# Patient Record
Sex: FEMALE | Race: WHITE | NOT HISPANIC OR LATINO
[De-identification: names, ages, dates, MRNs, and addresses within clinical notes are randomized per-mention and may not be internally consistent; named-entity substitution may affect disease eponyms.]

---

## 2018-05-23 ENCOUNTER — RESULT REVIEW (OUTPATIENT)
Age: 68
End: 2018-05-23

## 2021-08-29 ENCOUNTER — INPATIENT (INPATIENT)
Facility: HOSPITAL | Age: 71
LOS: 2 days | Discharge: HOME | DRG: 287 | End: 2021-09-01
Attending: INTERNAL MEDICINE | Admitting: INTERNAL MEDICINE
Payer: MEDICARE

## 2021-08-29 VITALS
TEMPERATURE: 98 F | HEIGHT: 65 IN | RESPIRATION RATE: 18 BRPM | DIASTOLIC BLOOD PRESSURE: 78 MMHG | OXYGEN SATURATION: 98 % | HEART RATE: 78 BPM | WEIGHT: 169.98 LBS | SYSTOLIC BLOOD PRESSURE: 144 MMHG

## 2021-08-29 LAB
ALBUMIN SERPL ELPH-MCNC: 3.9 G/DL — SIGNIFICANT CHANGE UP (ref 3.3–5)
ALP SERPL-CCNC: 102 U/L — SIGNIFICANT CHANGE UP (ref 40–120)
ALT FLD-CCNC: 10 U/L — SIGNIFICANT CHANGE UP (ref 10–45)
ANION GAP SERPL CALC-SCNC: 13 MMOL/L — SIGNIFICANT CHANGE UP (ref 5–17)
AST SERPL-CCNC: 18 U/L — SIGNIFICANT CHANGE UP (ref 10–40)
BASOPHILS # BLD AUTO: 0.06 K/UL — SIGNIFICANT CHANGE UP (ref 0–0.2)
BASOPHILS NFR BLD AUTO: 0.7 % — SIGNIFICANT CHANGE UP (ref 0–2)
BILIRUB SERPL-MCNC: 0.4 MG/DL — SIGNIFICANT CHANGE UP (ref 0.2–1.2)
BUN SERPL-MCNC: 9 MG/DL — SIGNIFICANT CHANGE UP (ref 7–23)
CALCIUM SERPL-MCNC: 9.5 MG/DL — SIGNIFICANT CHANGE UP (ref 8.4–10.5)
CHLORIDE SERPL-SCNC: 101 MMOL/L — SIGNIFICANT CHANGE UP (ref 96–108)
CO2 SERPL-SCNC: 23 MMOL/L — SIGNIFICANT CHANGE UP (ref 22–31)
CREAT SERPL-MCNC: 0.68 MG/DL — SIGNIFICANT CHANGE UP (ref 0.5–1.3)
EOSINOPHIL # BLD AUTO: 0.29 K/UL — SIGNIFICANT CHANGE UP (ref 0–0.5)
EOSINOPHIL NFR BLD AUTO: 3.3 % — SIGNIFICANT CHANGE UP (ref 0–6)
GLUCOSE SERPL-MCNC: 90 MG/DL — SIGNIFICANT CHANGE UP (ref 70–99)
HCT VFR BLD CALC: 41 % — SIGNIFICANT CHANGE UP (ref 34.5–45)
HGB BLD-MCNC: 13.3 G/DL — SIGNIFICANT CHANGE UP (ref 11.5–15.5)
IMM GRANULOCYTES NFR BLD AUTO: 0.5 % — SIGNIFICANT CHANGE UP (ref 0–1.5)
LIDOCAIN IGE QN: 15 U/L — SIGNIFICANT CHANGE UP (ref 7–60)
LYMPHOCYTES # BLD AUTO: 1.28 K/UL — SIGNIFICANT CHANGE UP (ref 1–3.3)
LYMPHOCYTES # BLD AUTO: 14.7 % — SIGNIFICANT CHANGE UP (ref 13–44)
MCHC RBC-ENTMCNC: 30.8 PG — SIGNIFICANT CHANGE UP (ref 27–34)
MCHC RBC-ENTMCNC: 32.4 GM/DL — SIGNIFICANT CHANGE UP (ref 32–36)
MCV RBC AUTO: 94.9 FL — SIGNIFICANT CHANGE UP (ref 80–100)
MONOCYTES # BLD AUTO: 0.68 K/UL — SIGNIFICANT CHANGE UP (ref 0–0.9)
MONOCYTES NFR BLD AUTO: 7.8 % — SIGNIFICANT CHANGE UP (ref 2–14)
NEUTROPHILS # BLD AUTO: 6.37 K/UL — SIGNIFICANT CHANGE UP (ref 1.8–7.4)
NEUTROPHILS NFR BLD AUTO: 73 % — SIGNIFICANT CHANGE UP (ref 43–77)
NRBC # BLD: 0 /100 WBCS — SIGNIFICANT CHANGE UP (ref 0–0)
PLATELET # BLD AUTO: 275 K/UL — SIGNIFICANT CHANGE UP (ref 150–400)
POTASSIUM SERPL-MCNC: 4.2 MMOL/L — SIGNIFICANT CHANGE UP (ref 3.5–5.3)
POTASSIUM SERPL-SCNC: 4.2 MMOL/L — SIGNIFICANT CHANGE UP (ref 3.5–5.3)
PROT SERPL-MCNC: 7.3 G/DL — SIGNIFICANT CHANGE UP (ref 6–8.3)
RBC # BLD: 4.32 M/UL — SIGNIFICANT CHANGE UP (ref 3.8–5.2)
RBC # FLD: 13.2 % — SIGNIFICANT CHANGE UP (ref 10.3–14.5)
SARS-COV-2 RNA SPEC QL NAA+PROBE: SIGNIFICANT CHANGE UP
SODIUM SERPL-SCNC: 137 MMOL/L — SIGNIFICANT CHANGE UP (ref 135–145)
TROPONIN T, HIGH SENSITIVITY RESULT: <6 NG/L — SIGNIFICANT CHANGE UP (ref 0–51)
TROPONIN T, HIGH SENSITIVITY RESULT: <6 NG/L — SIGNIFICANT CHANGE UP (ref 0–51)
WBC # BLD: 8.72 K/UL — SIGNIFICANT CHANGE UP (ref 3.8–10.5)
WBC # FLD AUTO: 8.72 K/UL — SIGNIFICANT CHANGE UP (ref 3.8–10.5)

## 2021-08-29 PROCEDURE — 71046 X-RAY EXAM CHEST 2 VIEWS: CPT | Mod: 26

## 2021-08-29 RX ORDER — CITALOPRAM 10 MG/1
5 TABLET, FILM COATED ORAL ONCE
Refills: 0 | Status: COMPLETED | OUTPATIENT
Start: 2021-08-29 | End: 2021-08-29

## 2021-08-29 RX ORDER — ACETAMINOPHEN 500 MG
975 TABLET ORAL ONCE
Refills: 0 | Status: COMPLETED | OUTPATIENT
Start: 2021-08-29 | End: 2021-08-29

## 2021-08-29 RX ORDER — SIMVASTATIN 20 MG/1
10 TABLET, FILM COATED ORAL AT BEDTIME
Refills: 0 | Status: DISCONTINUED | OUTPATIENT
Start: 2021-08-29 | End: 2021-08-30

## 2021-08-29 RX ORDER — ASPIRIN/CALCIUM CARB/MAGNESIUM 324 MG
324 TABLET ORAL ONCE
Refills: 0 | Status: COMPLETED | OUTPATIENT
Start: 2021-08-29 | End: 2021-08-29

## 2021-08-29 RX ORDER — ATORVASTATIN CALCIUM 80 MG/1
40 TABLET, FILM COATED ORAL AT BEDTIME
Refills: 0 | Status: DISCONTINUED | OUTPATIENT
Start: 2021-08-29 | End: 2021-08-29

## 2021-08-29 RX ORDER — FAMOTIDINE 10 MG/ML
20 INJECTION INTRAVENOUS ONCE
Refills: 0 | Status: COMPLETED | OUTPATIENT
Start: 2021-08-29 | End: 2021-08-29

## 2021-08-29 RX ORDER — GABAPENTIN 400 MG/1
400 CAPSULE ORAL ONCE
Refills: 0 | Status: COMPLETED | OUTPATIENT
Start: 2021-08-29 | End: 2021-08-29

## 2021-08-29 RX ORDER — ASPIRIN/CALCIUM CARB/MAGNESIUM 324 MG
325 TABLET ORAL ONCE
Refills: 0 | Status: DISCONTINUED | OUTPATIENT
Start: 2021-08-29 | End: 2021-08-29

## 2021-08-29 RX ADMIN — Medication 30 MILLILITER(S): at 20:04

## 2021-08-29 RX ADMIN — FAMOTIDINE 20 MILLIGRAM(S): 10 INJECTION INTRAVENOUS at 20:04

## 2021-08-29 RX ADMIN — SIMVASTATIN 10 MILLIGRAM(S): 20 TABLET, FILM COATED ORAL at 23:15

## 2021-08-29 RX ADMIN — GABAPENTIN 400 MILLIGRAM(S): 400 CAPSULE ORAL at 23:15

## 2021-08-29 RX ADMIN — Medication 324 MILLIGRAM(S): at 20:04

## 2021-08-29 RX ADMIN — Medication 975 MILLIGRAM(S): at 23:33

## 2021-08-29 RX ADMIN — CITALOPRAM 5 MILLIGRAM(S): 10 TABLET, FILM COATED ORAL at 23:22

## 2021-08-29 NOTE — ED ADULT NURSE NOTE - CHPI ED NUR SYMPTOMS NEG
no chills/no congestion/no diaphoresis/no dizziness/no fever/no nausea/no shortness of breath/no syncope/no vomiting

## 2021-08-29 NOTE — ED CDU PROVIDER INITIAL DAY NOTE - DETAILS
71 y F, hx of HLD, RHD (during childhood) c/o chest pain/epigastric pain  Plan: frequent reeval, vitals q 4hrs, tele, nuclear stress

## 2021-08-29 NOTE — ED PROVIDER NOTE - CLINICAL SUMMARY MEDICAL DECISION MAKING FREE TEXT BOX
71 y F, hx of HLD, presenting with epigastric/chest pain. Will do cardiac workup and admit to CDU for stress test. 71 y F, hx of HLD, presenting with epigastric/chest pain. ecg is normal  concern for unstable angina  vs GERD Will do cardiac workup and admit to CDU for stress test.tomorrow ZR

## 2021-08-29 NOTE — ED CDU PROVIDER DISPOSITION NOTE - NSFOLLOWUPINSTRUCTIONS_ED_ALL_ED_FT
1. Follow up with your PMD within 48-72 hours.   You may schedule appointment with Cardiology clinic this week by calling [phone]  2. Show copies of your reports given to you. Recommend Aspirin 81mg over the counter daily until further evaluation.  Take all of your other medications as previously prescribed.   3. Worsening or continued chest pain, shortness of breath, weakness, return to ED.

## 2021-08-29 NOTE — ED ADULT TRIAGE NOTE - CHIEF COMPLAINT QUOTE
PT presents to ED with epigastric pain described stabbing/pressure radiating through to her back. Pt takes a statin

## 2021-08-29 NOTE — ED CDU PROVIDER DISPOSITION NOTE - ATTENDING CONTRIBUTION TO CARE
Attending note (Javad): 70 y/o F with h/o HLD, rheumatic heart disease as child, c/o chest pain yesterday while cooking.  Resolved after ~1 hour; no fever, no cough/congestion, no SOB. In ED, patient evaluated, was stable, ECG showed no signs of acute ischemia/infarct. Initial labs notable for troponin < 6.In CDU, no events on tele monitoring, remained stable.  Further evaluation noted significant abnormalities on cardiac stress testing, and will plan for admission to hospital for further evaluation/management.

## 2021-08-29 NOTE — ED PROVIDER NOTE - NS ED ROS FT
Constitutional:  (-) fever, (-) chills, (-) lethargy  Eyes:  (-) eye pain (-) visual changes  ENMT: (-) nasal discharge, (-) sore throat. (-) neck pain or stiffness  Cardiac: (+) chest pain (-) palpitations  Respiratory:  (-) cough (-) respiratory distress.   GI:  (-) nausea (-) vomiting (-) diarrhea (-) abdominal pain (+) epigastric pain   :  (-) dysuria (-) frequency (-) burning.  MS:  (-) back pain (-) joint pain.  Neuro:  (-) headache (-) numbness (-) tingling (-) focal weakness  Skin:  (-) rash  Except as documented in the HPI,  all other systems are negative

## 2021-08-29 NOTE — ED CDU PROVIDER INITIAL DAY NOTE - PHYSICAL EXAMINATION
Gen: Patient is well-appearing, NAD, AAOx3, able to ambulate without assistance  HEENT: NCAT, normal conjunctiva, tongue midline, oral mucosa moist  Lung: CTAB, no respiratory distress, no wheezes/rhonchi/rales B/L, speaking in full sentences  CV: RRR, no murmurs, rubs or gallops, distal pulses 2+ b/l  Abd: soft, NT, ND, no guarding, no rigidity, no rebound tenderness, no CVA tenderness   MSK: no visible deformities, ROM normal in UE/LE  Neuro: No focal sensory or motor deficits  Skin: Warm, well perfused, 5 mm skin lesion (asymmetric, elevated, grey) noted in epigastric region, no leg swelling  Psych: normal affect, calm Gen: Patient is well-appearing, NAD, AAOx3, able to ambulate without assistance  HEENT: NCAT, normal conjunctiva, tongue midline, oral mucosa moist  Lung: CTAB, no respiratory distress, no wheezes/rhonchi/rales B/L, speaking in full sentences  CV: RRR, no murmurs, rubs or gallops, distal pulses 2+ b/l  Abd: soft, NT, ND, no guarding, no rigidity, no rebound tenderness, no CVA tenderness   MSK: no visible deformities, + decrease ROM left shoulder w/ ttp. No erythema, no swelling, no bony deformities.   Neuro: No focal sensory or motor deficits  Skin: Warm, well perfused, 5 mm skin lesion (asymmetric, elevated, grey) noted in epigastric region, no leg swelling  Psych: normal affect, calm

## 2021-08-29 NOTE — ED ADULT TRIAGE NOTE - HISTORY OF COVID-19 VACCINATION
Problem: Safety  Goal: Will remain free from injury  Outcome: PROGRESSING AS EXPECTED  Safety precautions in place. Call light within reach. Bed is low and in locked position. Hourly rounding in place.     Problem: Pain Management  Goal: Pain level will decrease to patient's comfort goal  Outcome: PROGRESSING AS EXPECTED   Follow pain managment plan developed in collaboration with patient and Interdisciplinary Team  Pain well managed with current regimen per MAR       Yes

## 2021-08-29 NOTE — ED CDU PROVIDER INITIAL DAY NOTE - OBJECTIVE STATEMENT
71 y F, hx of HLD, RHD (during childhood) presenting with 1-hour onset of chest pain/epigastric pain while she was cooking. Per patient, the pain presented as a sharp pain 8/10 that was radiating to her back and jaw. The pain has subsided now to 4/10. Patient has no previous history of CAD, pancreatitis, gallstone. Patient denies fever, shortness of breath, abdominal pain, presence of urinary symptoms or abnormal bowel movement. Patient also has hx of melanoma and has been following up with a dermatologist from [facility]. Last visit was in earlier Spring 2021.  In ED, patient had ekg no signs of acute ischemia, troponin <6, chest x ray no signs of acute pathology. Pt sent to CDU for frequent reeval, vitals q 4hrs, telemetry monitoring and stress.

## 2021-08-29 NOTE — ED PROVIDER NOTE - OBJECTIVE STATEMENT
71 y F, hx of HLD, RHD (during childhood) presenting with 1-hour onset of chest pain/epigastric pain while she was cooking. Per patient, the pain presented as a sharp pain 8/10 that was radiating to her back and jaw. The pain has subsided now to 4/10. Patient has no previous history of CAD, pancreatitis, gallstone. Patient denies fever, shortness of breath, abdominal pain, presence of urinary symptoms or abnormal bowel movement. Patient also has hx of melanoma and has been following up with a dermatologist from [facility]. Last visit was in earlier Spring 2021.

## 2021-08-29 NOTE — ED ADULT NURSE NOTE - OBJECTIVE STATEMENT
70y/o female aaox4 h/o HLD  presents to the ED ambulatory/WR  from home c/o chest pain  . Pt states that around 1 hour ago sudden onset a chest pain describing "sharp and stabbing" w/radiation to the back , Pt at this time  denies fever, chills, n/v, weakness, abd pain, diarrhea/constipation, numbness/tingling, urinary symptoms , in no respiratory distress, no CP, PT safety maintained with family at bedside, call bell within reach and bed in the lowest position.

## 2021-08-29 NOTE — ED PROVIDER NOTE - PHYSICAL EXAMINATION
Gen: Patient is well-appearing, NAD, AAOx3, able to ambulate without assistance  HEENT: NCAT, normal conjunctiva, tongue midline, oral mucosa moist  Lung: CTAB, no respiratory distress, no wheezes/rhonchi/rales B/L, speaking in full sentences  CV: RRR, no murmurs, rubs or gallops, distal pulses 2+ b/l  Abd: soft, NT, ND, no guarding, no rigidity, no rebound tenderness, no CVA tenderness   MSK: no visible deformities, ROM normal in UE/LE  Neuro: No focal sensory or motor deficits  Skin: Warm, well perfused, 5 mm skin lesion (asymmetric, elevated, grey) noted in epigastric region, no leg swelling  Psych: normal affect, calm

## 2021-08-29 NOTE — ED CDU PROVIDER DISPOSITION NOTE - CLINICAL COURSE
71 y F, hx of HLD, RHD (during childhood) presenting with 1-hour onset of chest pain/epigastric pain while she was cooking. Per patient, the pain presented as a sharp pain 8/10 that was radiating to her back and jaw. The pain has subsided now to 4/10. Patient has no previous history of CAD, pancreatitis, gallstone. Patient denies fever, shortness of breath, abdominal pain, presence of urinary symptoms or abnormal bowel movement. Patient also has hx of melanoma and has been following up with a dermatologist from [facility]. Last visit was in earlier Spring 2021.  In ED, patient had ekg no signs of acute ischemia, troponin <6, chest x ray no signs of acute pathology. Pt sent to CDU for frequent reeval, vitals q 4hrs, telemetry monitoring and stress. 71 y F, hx of HLD, RHD (during childhood) presenting with 1-hour onset of chest pain/epigastric pain while she was cooking. Per patient, the pain presented as a sharp pain 8/10 that was radiating to her back and jaw. The pain has subsided now to 4/10. Patient has no previous history of CAD, pancreatitis, gallstone. Patient denies fever, shortness of breath, abdominal pain, presence of urinary symptoms or abnormal bowel movement. Patient also has hx of melanoma and has been following up with a dermatologist from [facility]. Last visit was in earlier Spring 2021.  In ED, patient had ekg no signs of acute ischemia, troponin <6, chest x ray no signs of acute pathology. Pt sent to CDU for frequent reeval, vitals q 4hrs, telemetry monitoring and stress.     In CDU, pain improved, no events no tele, no cp. echo WNL, pts stress test abnormal, d/w pts cardiologist Dr. Garber, agrees with admission. D/w Dr. Farnsworth.

## 2021-08-29 NOTE — ED CDU PROVIDER INITIAL DAY NOTE - NS ED ROS FT
Constitutional:  (-) fever, (-) chills, (-) lethargy  Eyes:  (-) eye pain (-) visual changes  ENMT: (-) nasal discharge, (-) sore throat. (-) neck pain or stiffness  Cardiac: (+) chest pain (-) palpitations  Respiratory:  (-) cough (-) respiratory distress.   GI:  (-) nausea (-) vomiting (-) diarrhea (-) abdominal pain (+) epigastric pain   :  (-) dysuria (-) frequency (-) burning.  MS:  (-) back pain (-) joint pain.  Neuro:  (-) headache (-) numbness (-) tingling (-) focal weakness  Skin:  (-) rash  Except as documented in the HPI,  all other systems are negative Constitutional:  (-) fever, (-) chills, (-) lethargy  Eyes:  (-) eye pain (-) visual changes  ENMT: (-) nasal discharge, (-) sore throat. (-) neck pain or stiffness  Cardiac: (+) chest pain (-) palpitations  Respiratory:  (-) cough (-) respiratory distress.   GI:  (-) nausea (-) vomiting (-) diarrhea (-) abdominal pain (+) epigastric pain   :  (-) dysuria (-) frequency (-) burning.  MS:  (-) back pain (+) Left shoulder pain.  Neuro:  (-) headache (-) numbness (-) tingling (-) focal weakness  Skin:  (-) rash  Except as documented in the HPI,  all other systems are negative

## 2021-08-30 DIAGNOSIS — F41.9 ANXIETY DISORDER, UNSPECIFIED: ICD-10-CM

## 2021-08-30 DIAGNOSIS — M48.00 SPINAL STENOSIS, SITE UNSPECIFIED: ICD-10-CM

## 2021-08-30 DIAGNOSIS — E78.5 HYPERLIPIDEMIA, UNSPECIFIED: ICD-10-CM

## 2021-08-30 DIAGNOSIS — I20.9 ANGINA PECTORIS, UNSPECIFIED: ICD-10-CM

## 2021-08-30 DIAGNOSIS — R94.39 ABNORMAL RESULT OF OTHER CARDIOVASCULAR FUNCTION STUDY: ICD-10-CM

## 2021-08-30 LAB
A1C WITH ESTIMATED AVERAGE GLUCOSE RESULT: 5.6 % — SIGNIFICANT CHANGE UP (ref 4–5.6)
CHOLEST SERPL-MCNC: 181 MG/DL — SIGNIFICANT CHANGE UP
ESTIMATED AVERAGE GLUCOSE: 114 MG/DL — SIGNIFICANT CHANGE UP (ref 68–114)
HDLC SERPL-MCNC: 54 MG/DL — SIGNIFICANT CHANGE UP
LIPID PNL WITH DIRECT LDL SERPL: 110 MG/DL — HIGH
NON HDL CHOLESTEROL: 127 MG/DL — SIGNIFICANT CHANGE UP
TRIGL SERPL-MCNC: 84 MG/DL — SIGNIFICANT CHANGE UP

## 2021-08-30 PROCEDURE — 99223 1ST HOSP IP/OBS HIGH 75: CPT

## 2021-08-30 RX ORDER — CITALOPRAM 10 MG/1
1 TABLET, FILM COATED ORAL
Qty: 0 | Refills: 0 | DISCHARGE

## 2021-08-30 RX ORDER — ATORVASTATIN CALCIUM 80 MG/1
20 TABLET, FILM COATED ORAL AT BEDTIME
Refills: 0 | Status: DISCONTINUED | OUTPATIENT
Start: 2021-08-30 | End: 2021-09-01

## 2021-08-30 RX ORDER — CITALOPRAM 10 MG/1
10 TABLET, FILM COATED ORAL DAILY
Refills: 0 | Status: DISCONTINUED | OUTPATIENT
Start: 2021-08-30 | End: 2021-09-01

## 2021-08-30 RX ORDER — GABAPENTIN 400 MG/1
0 CAPSULE ORAL
Qty: 0 | Refills: 0 | DISCHARGE

## 2021-08-30 RX ORDER — GABAPENTIN 400 MG/1
400 CAPSULE ORAL AT BEDTIME
Refills: 0 | Status: DISCONTINUED | OUTPATIENT
Start: 2021-08-30 | End: 2021-09-01

## 2021-08-30 RX ORDER — ROSUVASTATIN CALCIUM 5 MG/1
1 TABLET ORAL
Qty: 0 | Refills: 0 | DISCHARGE

## 2021-08-30 RX ORDER — ACETAMINOPHEN 500 MG
650 TABLET ORAL EVERY 6 HOURS
Refills: 0 | Status: DISCONTINUED | OUTPATIENT
Start: 2021-08-30 | End: 2021-09-01

## 2021-08-30 RX ADMIN — ATORVASTATIN CALCIUM 20 MILLIGRAM(S): 80 TABLET, FILM COATED ORAL at 22:47

## 2021-08-30 RX ADMIN — CITALOPRAM 10 MILLIGRAM(S): 10 TABLET, FILM COATED ORAL at 22:47

## 2021-08-30 RX ADMIN — Medication 975 MILLIGRAM(S): at 00:02

## 2021-08-30 RX ADMIN — GABAPENTIN 400 MILLIGRAM(S): 400 CAPSULE ORAL at 22:47

## 2021-08-30 NOTE — H&P ADULT - NSHPLABSRESULTS_GEN_ALL_CORE
Personally reviewed old records.  Personally reviewed labs.  Personally reviewed imaging.  Personally reviewed EKG.                          13.3   8.72  )-----------( 275      ( 29 Aug 2021 19:29 )             41.0       08-29    137  |  101  |  9   ----------------------------<  90  4.2   |  23  |  0.68    Ca    9.5      29 Aug 2021 19:29    TPro  7.3  /  Alb  3.9  /  TBili  0.4  /  DBili  x   /  AST  18  /  ALT  10  /  AlkPhos  102  08-29            LIVER FUNCTIONS - ( 29 Aug 2021 19:29 )  Alb: 3.9 g/dL / Pro: 7.3 g/dL / ALK PHOS: 102 U/L / ALT: 10 U/L / AST: 18 U/L / GGT: x             < from: Transthoracic Echocardiogram (08.30.21 @ 13:09) >    Conclusions:  1. Endocardium not well visualized; grossly normal left  ventricular systolic function.  2. The right ventricle is not well visualized; grossly  normal right ventricular systolic function.  *** No previous Echo exam.    < end of copied text >    < from: Xray Chest 2 Views PA/Lat (08.29.21 @ 19:47) >      IMPRESSION: Clear lungs.    < end of copied text >

## 2021-08-30 NOTE — H&P ADULT - ASSESSMENT
71-year-old female with medical history of Rheumatic Heart Disease, Hyperlipidemia, Vertigo, Spinal Stenosis, Left Rotator Cuff tear with nerve impingement (4 months ago), and Depression/Anxiety who presented to the hospital for chest pain. Found to have an abnormal stress test with frequent VPDs and a possible reversible ischemic defect in the basal inferior and basal inferolateral walls.

## 2021-08-30 NOTE — ED CDU PROVIDER SUBSEQUENT DAY NOTE - PHYSICAL EXAMINATION
Gen: Patient is well-appearing, NAD, AAOx3, able to ambulate without assistance  HEENT: NCAT, normal conjunctiva, tongue midline, oral mucosa moist  Lung: CTAB, no respiratory distress, no wheezes/rhonchi/rales B/L, speaking in full sentences  CV: RRR, no murmurs, rubs or gallops, distal pulses 2+ b/l  Abd: soft, NT, ND, no guarding, no rigidity, no rebound tenderness, no CVA tenderness   MSK: no visible deformities, + decrease ROM left shoulder w/ ttp. No erythema, no swelling, no bony deformities.   Neuro: No focal sensory or motor deficits  Skin: Warm, well perfused, 5 mm skin lesion (asymmetric, elevated, grey) noted in epigastric region, no leg swelling  Psych: normal affect, calm

## 2021-08-30 NOTE — H&P ADULT - NSHPPHYSICALEXAM_GEN_ALL_CORE
Vital Signs Last 24 Hrs  T(C): 36.7 (30 Aug 2021 19:58), Max: 37.2 (29 Aug 2021 22:21)  T(F): 98 (30 Aug 2021 19:58), Max: 99 (29 Aug 2021 22:21)  HR: 79 (30 Aug 2021 19:58) (60 - 79)  BP: 123/78 (30 Aug 2021 19:58) (121/74 - 145/80)  BP(mean): --  RR: 18 (30 Aug 2021 19:58) (16 - 18)  SpO2: 96% (30 Aug 2021 19:58) (95% - 100%)

## 2021-08-30 NOTE — ED CDU PROVIDER SUBSEQUENT DAY NOTE - ATTENDING CONTRIBUTION TO CARE
Attending note (Javad):     70 y/o F with h/o HLD, rheumatic heart disease, who presented to the ED yesterday following an episode of chest pain lasting approximately 1 hour radiating to back and jaw, while she was cooking.  No SOB, no palpitations, no nausea/vomiting, no abdominal pain.  In ED, patient was afebrile, in NAD, and had work-up including ECG (which showed no signs of acute ischemia/infarct), CXR (clear lungs, grossly normal cardiomediatinal border) and labs (notable for troponin < 6). Patient sent to CDU for monitoring overnight and further evaluation with TTE and nuclear stress test.  During overnight monitoring, no events occurred.      At present, is pending Stress testing and TTE; if no acute findings can likely dc. Attending note (Javad):     72 y/o F with h/o HLD, rheumatic heart disease, who presented to the ED yesterday following an episode of chest pain lasting approximately 1 hour radiating to back and jaw, while she was cooking.  No SOB, no palpitations, no nausea/vomiting, no abdominal pain.  In ED, patient was afebrile, in NAD, and had work-up including ECG (which showed no signs of acute ischemia/infarct), CXR (clear lungs, grossly normal cardiomediatinal border) and labs (notable for troponin < 6). Patient sent to CDU for monitoring overnight and further evaluation with TTE and nuclear stress test.  During overnight monitoring, no events occurred.      At present, is pending Stress testing and TTE; if no acute findings can likely dc.    Addendum: Attending note (Javad): 72 y/o F with h/o HLD, rheumatic heart disease as child, c/o chest pain yesterday while cooking.  Resolved after ~1 hour; no fever, no cough/congestion, no SOB. In ED, patient evaluated, was stable, ECG showed no signs of acute ischemia/infarct. Initial labs notable for troponin < 6.In CDU, no events on tele monitoring, remained stable.  Further evaluation noted significant abnormalities on cardiac stress testing, and will plan for admission to hospital for further evaluation/management.

## 2021-08-30 NOTE — H&P ADULT - PROBLEM SELECTOR PLAN 3
-Pt with history of depression and panic attacks  -currently well controlled with home Celexa 10 mg daily, will continue.

## 2021-08-30 NOTE — H&P ADULT - NSHPADDITIONALINFOADULT_GEN_ALL_CORE
DVT Ppx: SCDs  Diet: Cardiac Diet  Code Status: FC    I have seen and examined the patient.  I have personally reviewed all labs, imaging, and EKG.    Manpreet Pierre DO  Hospitalist, Department of Medicine  Pager: [phone] (available 8PM-8AM)

## 2021-08-30 NOTE — ED CDU PROVIDER SUBSEQUENT DAY NOTE - HISTORY
CDU PROGRESS NOTE PA GAVIN: Pt resting comfortably, NAD, VSS. No events on telemetry. Pt reports having ?labrum tear left shoulder causing pain, but denies chest pain/epigastric pain or other complaints. Will continue to monitor, Stress and TTE in am.

## 2021-08-30 NOTE — ED ADULT NURSE REASSESSMENT NOTE - NS ED NURSE REASSESS COMMENT FT1
On CM Denies chest pain
Received awake alert and orientedx4 . Denies chest pain or sob.
Report given to EBONY Lynne. AO4. VSS as per flowsheet. Pt denies pain. Safety maintained.
Returned from Stress test Pt awake alert and orientedx4 Resp even and nonlab denies chest pain or SOB
Pt received from EBONY Stokes. Pt oriented to CDU & plan of care was discussed. Pt A&O x 4. Pt admitted. Pt denies any chest pain, SOB, dizziness or palpitations as of now. Pt on a cardiac monitor in NSR, HR in 80's. V/S stable, pt afebrile,  IV in place, patent and free of signs of infiltration. Pt resting in bed. Safety & comfort measures maintained. Call bell in reach. Will continue to monitor.

## 2021-08-30 NOTE — ED CDU PROVIDER SUBSEQUENT DAY NOTE - PROGRESS NOTE DETAILS
Pt resting comfortably. NAD. No complaints. VSS. no events on tele, no CP, sob, difficulty breathing. pending stress this am, will cont to monitor. pts stress test abnormal, d/w pts cardiologist Dr. Garber, agrees with admission. -ASA TurpinC

## 2021-08-30 NOTE — H&P ADULT - NSHPSOCIALHISTORY_GEN_ALL_CORE
Denies tobacco abuse  Social alcohol use  Denies recreational drug use    Patient lives with spouse. 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

## 2021-08-30 NOTE — H&P ADULT - HISTORY OF PRESENT ILLNESS
Chief Complaint: Chest pain    HPI: 71-year-old female with medical history of Rheumatic  Chief Complaint: Chest pain    HPI: 71-year-old female with medical history of Rheumatic Heart Disease, Hyperlipidemia, Vertigo, Spinal Stenosis, Left Rotator Cuff tear with nerve impingement (4 months ago), and Depression/Anxiety who presented to the hospital for chest pain. She stated her pain started around 5:30 pm on Sunday while she was making dinner. She had just eaten a snack and initially attributed the pain to indigestion. She described it as substernal near the xiphoid process, sharp, 8/10 in severity, and radiating to her back. It was not associated with shortness of breath. She stated her chest pain is currently resolved.

## 2021-08-30 NOTE — H&P ADULT - PROBLEM SELECTOR PLAN 1
-Plan for admission for possible cardiac cath. Chest pain currently resolved  -s/p  mg in ED  -HS Troponin <6 x2  -EKG reviewed and with NSR and isolated small TWI in lead II  -Telemetry monitoring -Plan for admission for possible cardiac cath. Chest pain currently resolved  -s/p  mg in ED  -HS Troponin <6 x2  -A1c 5.6, follow-up TSH in AM.  -EKG reviewed and with NSR and isolated small TWI in lead II  -Telemetry monitoring    --Patient with IV Contrast allergy (hives), unclear timing of cardiac catheterization, if planned for tomorrow. Upon scheduling would pre-medicate (ie, steroids, benadryl) at appropriate timing intervals per protocol to prevent allergic reaction.

## 2021-08-30 NOTE — ED CDU PROVIDER SUBSEQUENT DAY NOTE - NS ED ROS FT
Constitutional:  (-) fever, (-) chills, (-) lethargy  Eyes:  (-) eye pain (-) visual changes  ENMT: (-) nasal discharge, (-) sore throat. (-) neck pain or stiffness  Cardiac: (+) chest pain (-) palpitations  Respiratory:  (-) cough (-) respiratory distress.   GI:  (-) nausea (-) vomiting (-) diarrhea (-) abdominal pain (+) epigastric pain   :  (-) dysuria (-) frequency (-) burning.  MS:  (-) back pain (+) left shoulder pain.  Neuro:  (-) headache (-) numbness (-) tingling (-) focal weakness  Skin:  (-) rash  Except as documented in the HPI,  all other systems are negative

## 2021-08-31 LAB
ANION GAP SERPL CALC-SCNC: 12 MMOL/L — SIGNIFICANT CHANGE UP (ref 5–17)
BASOPHILS # BLD AUTO: 0.07 K/UL — SIGNIFICANT CHANGE UP (ref 0–0.2)
BASOPHILS NFR BLD AUTO: 1 % — SIGNIFICANT CHANGE UP (ref 0–2)
BUN SERPL-MCNC: 11 MG/DL — SIGNIFICANT CHANGE UP (ref 7–23)
CALCIUM SERPL-MCNC: 9.6 MG/DL — SIGNIFICANT CHANGE UP (ref 8.4–10.5)
CHLORIDE SERPL-SCNC: 104 MMOL/L — SIGNIFICANT CHANGE UP (ref 96–108)
CO2 SERPL-SCNC: 24 MMOL/L — SIGNIFICANT CHANGE UP (ref 22–31)
COVID-19 SPIKE DOMAIN AB INTERP: POSITIVE
COVID-19 SPIKE DOMAIN ANTIBODY RESULT: >250 U/ML — HIGH
CREAT SERPL-MCNC: 0.73 MG/DL — SIGNIFICANT CHANGE UP (ref 0.5–1.3)
EOSINOPHIL # BLD AUTO: 0.32 K/UL — SIGNIFICANT CHANGE UP (ref 0–0.5)
EOSINOPHIL NFR BLD AUTO: 4.4 % — SIGNIFICANT CHANGE UP (ref 0–6)
GLUCOSE SERPL-MCNC: 101 MG/DL — HIGH (ref 70–99)
HCT VFR BLD CALC: 41.6 % — SIGNIFICANT CHANGE UP (ref 34.5–45)
HCV AB S/CO SERPL IA: 0.11 S/CO — SIGNIFICANT CHANGE UP (ref 0–0.99)
HCV AB SERPL-IMP: SIGNIFICANT CHANGE UP
HGB BLD-MCNC: 13.3 G/DL — SIGNIFICANT CHANGE UP (ref 11.5–15.5)
IMM GRANULOCYTES NFR BLD AUTO: 0.3 % — SIGNIFICANT CHANGE UP (ref 0–1.5)
LYMPHOCYTES # BLD AUTO: 1.57 K/UL — SIGNIFICANT CHANGE UP (ref 1–3.3)
LYMPHOCYTES # BLD AUTO: 21.5 % — SIGNIFICANT CHANGE UP (ref 13–44)
MCHC RBC-ENTMCNC: 30.4 PG — SIGNIFICANT CHANGE UP (ref 27–34)
MCHC RBC-ENTMCNC: 32 GM/DL — SIGNIFICANT CHANGE UP (ref 32–36)
MCV RBC AUTO: 95.2 FL — SIGNIFICANT CHANGE UP (ref 80–100)
MONOCYTES # BLD AUTO: 0.6 K/UL — SIGNIFICANT CHANGE UP (ref 0–0.9)
MONOCYTES NFR BLD AUTO: 8.2 % — SIGNIFICANT CHANGE UP (ref 2–14)
NEUTROPHILS # BLD AUTO: 4.73 K/UL — SIGNIFICANT CHANGE UP (ref 1.8–7.4)
NEUTROPHILS NFR BLD AUTO: 64.6 % — SIGNIFICANT CHANGE UP (ref 43–77)
NRBC # BLD: 0 /100 WBCS — SIGNIFICANT CHANGE UP (ref 0–0)
PLATELET # BLD AUTO: 268 K/UL — SIGNIFICANT CHANGE UP (ref 150–400)
POTASSIUM SERPL-MCNC: 3.8 MMOL/L — SIGNIFICANT CHANGE UP (ref 3.5–5.3)
POTASSIUM SERPL-SCNC: 3.8 MMOL/L — SIGNIFICANT CHANGE UP (ref 3.5–5.3)
RBC # BLD: 4.37 M/UL — SIGNIFICANT CHANGE UP (ref 3.8–5.2)
RBC # FLD: 13.4 % — SIGNIFICANT CHANGE UP (ref 10.3–14.5)
SARS-COV-2 IGG+IGM SERPL QL IA: >250 U/ML — HIGH
SARS-COV-2 IGG+IGM SERPL QL IA: POSITIVE
SODIUM SERPL-SCNC: 140 MMOL/L — SIGNIFICANT CHANGE UP (ref 135–145)
TSH SERPL-MCNC: 1.87 UIU/ML — SIGNIFICANT CHANGE UP (ref 0.27–4.2)
WBC # BLD: 7.31 K/UL — SIGNIFICANT CHANGE UP (ref 3.8–10.5)
WBC # FLD AUTO: 7.31 K/UL — SIGNIFICANT CHANGE UP (ref 3.8–10.5)

## 2021-08-31 RX ORDER — DIPHENHYDRAMINE HCL 50 MG
50 CAPSULE ORAL ONCE
Refills: 0 | Status: COMPLETED | OUTPATIENT
Start: 2021-09-01 | End: 2021-09-01

## 2021-08-31 RX ORDER — ZALEPLON 10 MG
5 CAPSULE ORAL AT BEDTIME
Refills: 0 | Status: DISCONTINUED | OUTPATIENT
Start: 2021-08-31 | End: 2021-09-01

## 2021-08-31 RX ADMIN — Medication 50 MILLIGRAM(S): at 11:49

## 2021-08-31 RX ADMIN — Medication 50 MILLIGRAM(S): at 21:46

## 2021-08-31 RX ADMIN — GABAPENTIN 400 MILLIGRAM(S): 400 CAPSULE ORAL at 21:46

## 2021-08-31 RX ADMIN — CITALOPRAM 10 MILLIGRAM(S): 10 TABLET, FILM COATED ORAL at 21:46

## 2021-08-31 RX ADMIN — Medication 5 MILLIGRAM(S): at 21:46

## 2021-08-31 NOTE — CONSULT NOTE ADULT - SUBJECTIVE AND OBJECTIVE BOX
C A R D I O L O G Y  *********************    DATE OF SERVICE: 08-31-21    HISTORY OF PRESENT ILLNESS: HPI:  Patient is a 70 y/o female with PMH of Rheumatic Heart Disease, Hyperlipidemia, Vertigo, Spinal Stenosis, Left Rotator Cuff tear with nerve impingement (4 months ago), and Depression/Anxiety who presented with chest pain found to have an abnormal exercise NST. Cardiology consulted for further evaluation. She stated her pain started around 5:30 pm on Sunday while she was making dinner. She had just eaten a snack and initially attributed the pain to indigestion. She described it as substernal near the xiphoid process, sharp, 8/10 in severity, and radiating to her back. She stated her chest pain is currently resolved. She also reports ABURTO especially with stairs over the last 6 months. Denies SOB at rest, palpitations, dizziness, or syncope.      PAST MEDICAL & SURGICAL HISTORY:  HLD (hyperlipidemia)    Heart murmur after rheumatic heart disease    No significant past surgical history    MEDICATIONS:  MEDICATIONS  (STANDING):  atorvastatin 20 milliGRAM(s) Oral at bedtime  citalopram 10 milliGRAM(s) Oral daily  gabapentin 400 milliGRAM(s) Oral at bedtime  predniSONE   Tablet 50 milliGRAM(s) Oral once      Allergies    IV Contrast (Hives)  latex (Rash)  penicillins (Anaphylaxis)    Intolerances        FAMILY HISTORY:  FH: heart disease (Grandparent)      Non-contributary for premature coronary disease or sudden cardiac death    SOCIAL HISTORY:    [x ] Non-smoker  [ ] Smoker  [ ] Alcohol    FLU VACCINE THIS YEAR STARTS IN AUGUST:  [ ] Yes    [ ] No    IF OVER 65 HAVE YOU EVER HAD A PNA VACCINE:  [ ] Yes    [ ] No       [ ] N/A      REVIEW OF SYSTEMS:  [x ]chest pain  [ x ]shortness of breath  [  ]palpitations  [  ]syncope  [ ]near syncope [ ]upper extremity weakness   [ ] lower extremity weakness  [  ]diplopia  [  ]altered mental status   [  ]fevers  [ ]chills [ ]nausea  [ ]vomitting  [  ]dysphagia    [ ]abdominal pain  [ ]melena  [ ]BRBPR    [  ]epistaxis  [  ]rash    [ ]lower extremity edema        [X] All others negative	  [ ] Unable to obtain      LABS:	 	    CARDIAC MARKERS:                              13.3   7.31  )-----------( 268      ( 31 Aug 2021 00:54 )             41.6     Hb Trend: 13.3<--    08-31    140  |  104  |  11  ----------------------------<  101<H>  3.8   |  24  |  0.73    Ca    9.6      31 Aug 2021 00:54    TPro  7.3  /  Alb  3.9  /  TBili  0.4  /  DBili  x   /  AST  18  /  ALT  10  /  AlkPhos  102  08-29    Creatinine Trend: 0.73<--, 0.68<--    Coags:      proBNP:   Lipid Profile:   HgA1c:   TSH: Thyroid Stimulating Hormone, Serum: 1.87 uIU/mL (08-31 @ 04:16)          PHYSICAL EXAM:  T(C): 36.4 (08-31-21 @ 08:55), Max: 36.7 (08-30-21 @ 19:58)  HR: 77 (08-31-21 @ 08:55) (66 - 79)  BP: 133/55 (08-31-21 @ 08:55) (123/78 - 159/80)  RR: 18 (08-31-21 @ 08:55) (18 - 18)  SpO2: 98% (08-31-21 @ 08:55) (96% - 99%)  Wt(kg): --   BMI (kg/m2): 28.3 (08-29-21 @ 18:41)  I&O's Summary    30 Aug 2021 07:01  -  31 Aug 2021 07:00  --------------------------------------------------------  IN: 200 mL / OUT: 0 mL / NET: 200 mL    31 Aug 2021 07:01  -  31 Aug 2021 19:24  --------------------------------------------------------  IN: 480 mL / OUT: 0 mL / NET: 480 mL        Gen: Appears well in NAD  HEENT:  (-)icterus (-)pallor  CV: N S1 S2 1/6 DEVANTE (+)2 Pulses B/l  Resp:  Clear to auscultation B/L, normal effort  GI: (+) BS Soft, NT, ND  Lymph:  (-)Edema, (-)obvious lymphadenopathy  Skin: Warm to touch, Normal turgor  Psych: Appropriate mood and affect      TELEMETRY: SR 60-90s	      ECG: NSR, no acute ST-T changes 	    < from: Transthoracic Echocardiogram (08.30.21 @ 13:09) >  Conclusions:  1. Endocardium not well visualized; grossly normal left  ventricular systolic function.  2. The right ventricle is not well visualized; grossly  normal right ventricular systolic function.  *** No previous Echo exam.    < end of copied text >      < from: Nuclear Stress Test-Exercise (Nuclear Stress Test-Exercise .) (08.30.21 @ 15:42) >  IMPRESSIONS:Abnormal Study  * Exercise capacity: 6 METS, Poor for age and gender.  * Chest Pain: No chest pain with exercise.  * Symptom: Dyspnea.  * HR Response: Appropriate.  * BP Response: Appropriate.  * Heart Rhythm: Sinus Rhythm - 90 BPM.  * Baseline ECG: There were approximately 0.5 mm horizontal  ST segment depressions and T wave inversions in leads II,  III, and aVF at baseline.  * ECG Changes: ST Depression: 0.5 mm upsloping in leads  V4-V6 started at 1:00 min of exercise at HR of 110 bpm and  persisted at least 5:00 min in recovery.  These changes  did not meet ischemia criteria.  * Arrhythmia: Frequent VPDs and a ventricular couplet  occurred during stress.  * Review of raw data shows: Minor motion artifact.  * The left ventricle was small in size.  * There is a small, mild defect in the basal inferior and  basal inferolateral walls that is reversible suggestive of  ischemia.  * There is a small, moderate defect in the basal septal  wall that is fixed suggestive of scar.  * Post-stress gated wall motion analysis was performed  (LVEF > 70%;LVEDV = 46 ml.) revealing hypokinesis of the  basal septal wall with normal overall left ventricular  ejection fraction.  *** No previous Nuclear/Stress exam.    < end of copied text >      RADIOLOGY:         CXR: Clear Lungs    ASSESSMENT/PLAN: Patient is a 70 y/o female with PMH of Rheumatic Heart Disease, Hyperlipidemia, Vertigo, Spinal Stenosis, Left Rotator Cuff tear with nerve impingement (4 months ago), and Depression/Anxiety who presented with chest pain found to have an abnormal exercise NST. Cardiology consulted for further evaluation.    - TTE with normal LV function  - Abnormal NST noted above  - Plan for cardiac cath tomorrow (needs contrast allergy premedication per cath lab protocol with prednisone)    Pelon Bailey PA-C  Pager: [phone]

## 2021-08-31 NOTE — CONSULT NOTE ADULT - ASSESSMENT
Patient seen and examined, agree with above assessment and plan as transcribed above.    - Atypical CP,  but has also noted ABURTO and a decrease in exercise tolerance  - Stress suggestive of CAD  - Plan for left heart cath after premeds    Ponce Wilhelm MD, [facility]  BEEPER [phone]

## 2021-09-01 ENCOUNTER — TRANSCRIPTION ENCOUNTER (OUTPATIENT)
Age: 71
End: 2021-09-01

## 2021-09-01 VITALS
OXYGEN SATURATION: 98 % | HEART RATE: 78 BPM | RESPIRATION RATE: 16 BRPM | SYSTOLIC BLOOD PRESSURE: 129 MMHG | DIASTOLIC BLOOD PRESSURE: 65 MMHG

## 2021-09-01 PROCEDURE — 83036 HEMOGLOBIN GLYCOSYLATED A1C: CPT

## 2021-09-01 PROCEDURE — 83690 ASSAY OF LIPASE: CPT

## 2021-09-01 PROCEDURE — C1769: CPT

## 2021-09-01 PROCEDURE — 93005 ELECTROCARDIOGRAM TRACING: CPT

## 2021-09-01 PROCEDURE — 85025 COMPLETE CBC W/AUTO DIFF WBC: CPT

## 2021-09-01 PROCEDURE — 96374 THER/PROPH/DIAG INJ IV PUSH: CPT

## 2021-09-01 PROCEDURE — 84484 ASSAY OF TROPONIN QUANT: CPT

## 2021-09-01 PROCEDURE — 93454 CORONARY ARTERY ANGIO S&I: CPT | Mod: 26

## 2021-09-01 PROCEDURE — 80061 LIPID PANEL: CPT

## 2021-09-01 PROCEDURE — 80048 BASIC METABOLIC PNL TOTAL CA: CPT

## 2021-09-01 PROCEDURE — 93306 TTE W/DOPPLER COMPLETE: CPT

## 2021-09-01 PROCEDURE — U0003: CPT

## 2021-09-01 PROCEDURE — 86803 HEPATITIS C AB TEST: CPT

## 2021-09-01 PROCEDURE — A9500: CPT

## 2021-09-01 PROCEDURE — 99285 EMERGENCY DEPT VISIT HI MDM: CPT | Mod: 25

## 2021-09-01 PROCEDURE — 80053 COMPREHEN METABOLIC PANEL: CPT

## 2021-09-01 PROCEDURE — G0378: CPT

## 2021-09-01 PROCEDURE — 86769 SARS-COV-2 COVID-19 ANTIBODY: CPT

## 2021-09-01 PROCEDURE — 93017 CV STRESS TEST TRACING ONLY: CPT

## 2021-09-01 PROCEDURE — 93454 CORONARY ARTERY ANGIO S&I: CPT

## 2021-09-01 PROCEDURE — C1894: CPT

## 2021-09-01 PROCEDURE — 71046 X-RAY EXAM CHEST 2 VIEWS: CPT

## 2021-09-01 PROCEDURE — C1887: CPT

## 2021-09-01 PROCEDURE — 84443 ASSAY THYROID STIM HORMONE: CPT

## 2021-09-01 PROCEDURE — 78452 HT MUSCLE IMAGE SPECT MULT: CPT | Mod: MA

## 2021-09-01 RX ADMIN — Medication 50 MILLIGRAM(S): at 12:56

## 2021-09-01 RX ADMIN — Medication 50 MILLIGRAM(S): at 04:33

## 2021-09-01 RX ADMIN — Medication 50 MILLIGRAM(S): at 12:58

## 2021-09-01 NOTE — PROGRESS NOTE ADULT - ASSESSMENT
71-year-old female with medical history of Rheumatic Heart Disease, Hyperlipidemia, Vertigo, Spinal Stenosis, Left Rotator Cuff tear with nerve impingement (4 months ago), and Depression/Anxiety who presented to the hospital for chest pain. Found to have an abnormal stress test with frequent VPDs and a possible reversible ischemic defect in the basal inferior and basal inferolateral walls.    Problem/Plan - 1:  ·  Problem: Angina pectoris.   ·  Plan: -Chest pain currently resolved  -s/p  mg in ED  -HS Troponin <6 x2  -A1c 5.6  -EKG reviewed and with NSR and isolated small TWI in lead II  -Telemetry monitoring  -Patient with IV Contrast allergy (hives), will need premedication prior to cath (scheduled for 9/1/21)  -Appreciate cardiology   Problem/Plan - 2:  ·  Problem: Hyperlipidemia.   ·  Plan: -LFTs reviewed with   -Atorvastatin 20 mg (formulary interchange for home Rosuvastatin).    Problem/Plan - 3:  ·  Problem: Anxiety.   ·  Plan: -Pt with history of depression and panic attacks  -currently well controlled with home Celexa 10 mg daily, will continue.    Problem/Plan - 4:  ·  Problem: Spinal stenosis.   ·  Plan: -Continue home Gabapentin 400 mg qHS.    
71-year-old female with medical history of Rheumatic Heart Disease, Hyperlipidemia, Vertigo, Spinal Stenosis, Left Rotator Cuff tear with nerve impingement (4 months ago), and Depression/Anxiety who presented to the hospital for chest pain. Found to have an abnormal stress test with frequent VPDs and a possible reversible ischemic defect in the basal inferior and basal inferolateral walls.    Problem/Plan - 1:  ·  Problem: Angina pectoris.   ·  Plan: -Chest pain currently resolved  -s/p  mg in ED  -HS Troponin <6 x2  -A1c 5.6  -EKG reviewed and with NSR and isolated small TWI in lead II  -Telemetry monitoring  -Patient with IV Contrast allergy (hives), premedication ordered prior to cath (scheduled for 9/1/21)  -Appreciate cardiology     Problem/Plan - 2:  ·  Problem: Hyperlipidemia.   ·  Plan: -LFTs reviewed with   -Atorvastatin 20 mg (formulary interchange for home Rosuvastatin).    Problem/Plan - 3:  ·  Problem: Anxiety.   ·  Plan: -Pt with history of depression and panic attacks  -currently well controlled with home Celexa 10 mg daily, will continue.    Problem/Plan - 4:  ·  Problem: Spinal stenosis.   ·  Plan: -Continue home Gabapentin 400 mg qHS.    
CARDIOLOGY ATTENDING    Agree with above. F/U cath today.

## 2021-09-01 NOTE — DISCHARGE NOTE PROVIDER - NSDCCPCAREPLAN_GEN_ALL_CORE_FT
PRINCIPAL DISCHARGE DIAGNOSIS  Diagnosis: Chest pain  Assessment and Plan of Treatment:       SECONDARY DISCHARGE DIAGNOSES  Diagnosis: HLD (hyperlipidemia)  Assessment and Plan of Treatment: Continue with your cholesterol medications. Eat a heart healthy diet that is low in saturated fats and salt, and includes whole grains, fruits, vegetables and lean protein; exercise regularly (consult with your physician or cardiologist first); maintain a heart healthy weight; if you smoke - quit (A resource to help you stop smoking is the [facility] – phone number [phone].). Continue to follow with your primary physician or cardiologist. Your LDL cholesterol will be less than 70mg/dL    Diagnosis: Anxiety  Assessment and Plan of Treatment: antianxiety meds

## 2021-09-01 NOTE — DISCHARGE NOTE PROVIDER - NSDCCPTREATMENT_GEN_ALL_CORE_FT
PRINCIPAL PROCEDURE  Procedure: Left heart cardiac cath  Findings and Treatment: No heavy lifting for 2 weeks, no strenuous activity  ( pushing/ pulling) no driving for x 2 days,  you may shower 24 hours following procedure but no bathing or swimming for x1  week, no strenuous sex for x 1 week & follow up with your cardiologist in 1-2 week

## 2021-09-01 NOTE — PROGRESS NOTE ADULT - SUBJECTIVE AND OBJECTIVE BOX
C A R D I O L O G Y  **********************************     DATE OF SERVICE: 09-01-21    Mild ABURTO this AM, denies chest pain or SOB currently. Review of systems otherwise (-)  	  MEDICATIONS:  MEDICATIONS  (STANDING):  atorvastatin 20 milliGRAM(s) Oral at bedtime  citalopram 10 milliGRAM(s) Oral daily  diphenhydrAMINE   Injectable 50 milliGRAM(s) IV Push once  gabapentin 400 milliGRAM(s) Oral at bedtime  predniSONE   Tablet 50 milliGRAM(s) Oral once      LABS:	 	    CARDIAC MARKERS:                                13.3   7.31  )-----------( 268      ( 31 Aug 2021 00:54 )             41.6     Hemoglobin: 13.3 g/dL (08-31 @ 00:54)  Hemoglobin: 13.3 g/dL (08-29 @ 19:29)      08-31    140  |  104  |  11  ----------------------------<  101<H>  3.8   |  24  |  0.73    Ca    9.6      31 Aug 2021 00:54      Creatinine Trend: 0.73<--, 0.68<--    COAGS:       proBNP:   Lipid Profile:   HgA1c:   TSH:       PHYSICAL EXAM:  T(C): 36.7 (09-01-21 @ 09:11), Max: 36.9 (08-31-21 @ 20:02)  HR: 72 (09-01-21 @ 09:11) (66 - 82)  BP: 146/68 (09-01-21 @ 09:11) (136/64 - 146/68)  RR: 17 (09-01-21 @ 09:11) (17 - 17)  SpO2: 99% (09-01-21 @ 09:11) (97% - 99%)  Wt(kg): --  I&O's Summary    31 Aug 2021 07:01  -  01 Sep 2021 07:00  --------------------------------------------------------  IN: 480 mL / OUT: 0 mL / NET: 480 mL          Gen: Appears well in NAD  HEENT:  (-)icterus (-)pallor  CV: N S1 S2 1/6 DEVANTE (+)2 Pulses B/l  Resp:  Clear to auscultation B/L, normal effort  GI: (+) BS Soft, NT, ND  Lymph:  (-)Edema, (-)obvious lymphadenopathy  Skin: Warm to touch, Normal turgor  Psych: Appropriate mood and affect      TELEMETRY: SR 80s	      < from: Transthoracic Echocardiogram (08.30.21 @ 13:09) >  Conclusions:  1. Endocardium not well visualized; grossly normal left  ventricular systolic function.  2. The right ventricle is not well visualized; grossly  normal right ventricular systolic function.  *** No previous Echo exam.    < end of copied text >      < from: Nuclear Stress Test-Exercise (Nuclear Stress Test-Exercise .) (08.30.21 @ 15:42) >  IMPRESSIONS:Abnormal Study  * Exercise capacity: 6 METS, Poor for age and gender.  * Chest Pain: No chest pain with exercise.  * Symptom: Dyspnea.  * HR Response: Appropriate.  * BP Response: Appropriate.  * Heart Rhythm: Sinus Rhythm - 90 BPM.  * Baseline ECG: There were approximately 0.5 mm horizontal  ST segment depressions and T wave inversions in leads II,  III, and aVF at baseline.  * ECG Changes: ST Depression: 0.5 mm upsloping in leads  V4-V6 started at 1:00 min of exercise at HR of 110 bpm and  persisted at least 5:00 min in recovery.  These changes  did not meet ischemia criteria.  * Arrhythmia: Frequent VPDs and a ventricular couplet  occurred during stress.  * Review of raw data shows: Minor motion artifact.  * The left ventricle was small in size.  * There is a small, mild defect in the basal inferior and  basal inferolateral walls that is reversible suggestive of  ischemia.  * There is a small, moderate defect in the basal septal  wall that is fixed suggestive of scar.  * Post-stress gated wall motion analysis was performed  (LVEF > 70%;LVEDV = 46 ml.) revealing hypokinesis of the  basal septal wall with normal overall left ventricular  ejection fraction.  *** No previous Nuclear/Stress exam.    < end of copied text >      RADIOLOGY:         CXR: Clear Lungs    ASSESSMENT/PLAN: Patient is a 70 y/o female with PMH of Rheumatic Heart Disease, Hyperlipidemia, Vertigo, Spinal Stenosis, Left Rotator Cuff tear with nerve impingement (4 months ago), and Depression/Anxiety who presented with chest pain found to have an abnormal exercise NST. Cardiology consulted for further evaluation.    - TTE with normal LV function  - Abnormal NST noted above  - Plan for cardiac cath today (receiving premedication with prednisone for contrast allergy per cath lab protocol)    Pelon Bailey PA-C  Pager: [phone]      
Patient is a 71y old  Female who presents with a chief complaint of Chest Pain, abnormal stress test (30 Aug 2021 20:58)      SUBJECTIVE / OVERNIGHT EVENTS:    Without chest discomfort or acute SOB this morning    Vital Signs Last 24 Hrs  T(C): 36.4 (31 Aug 2021 08:55), Max: 36.7 (30 Aug 2021 12:08)  T(F): 97.5 (31 Aug 2021 08:55), Max: 98.1 (31 Aug 2021 05:21)  HR: 77 (31 Aug 2021 08:55) (66 - 79)  BP: 133/55 (31 Aug 2021 08:55) (121/74 - 159/80)  BP(mean): 88 (31 Aug 2021 05:21) (88 - 101)  RR: 18 (31 Aug 2021 08:55) (16 - 18)  SpO2: 98% (31 Aug 2021 08:55) (96% - 99%)  I&O's Summary    30 Aug 2021 07:01  -  31 Aug 2021 07:00  --------------------------------------------------------  IN: 200 mL / OUT: 0 mL / NET: 200 mL    31 Aug 2021 07:01  -  31 Aug 2021 10:37  --------------------------------------------------------  IN: 240 mL / OUT: 0 mL / NET: 240 mL        GENERAL: NAD, well-developed  HEAD:  Atraumatic, Normocephalic  EYES: EOMI, PERRLA, conjunctiva and sclera clear  NECK: Supple, No JVD, No LAD, No carotid bruits, No thyromegaly  CHEST/LUNG: Clear to auscultation bilaterally; Nl work of breathing  HEART: Regular rate and rhythm; Nl S1/S2; No murmurs, rubs, or gallops  ABDOMEN: Soft, Nontender, Nondistended; Bowel sounds present; No hepatosplenomegaly  EXTREMITIES:  2+ Peripheral Pulses, No clubbing, cyanosis, or edema  SKIN: No rashes or lesions; Normal turgor, texture  NEURO: A+O x 3; nonfocal CN/motor/sensory/reflexes  PSYCH: Nl affect; no agitation or delirium; no suicidal or homicidal ideation    LABS:                        13.3   7.31  )-----------( 268      ( 31 Aug 2021 00:54 )             41.6     08-31    140  |  104  |  11  ----------------------------<  101<H>  3.8   |  24  |  0.73    Ca    9.6      31 Aug 2021 00:54    TPro  7.3  /  Alb  3.9  /  TBili  0.4  /  DBili  x   /  AST  18  /  ALT  10  /  AlkPhos  102  08-29      CAPILLARY BLOOD GLUCOSE                RADIOLOGY & ADDITIONAL TESTS:    Imaging Personally Reviewed:  [x] YES  [ ] NO    Will obtain old records:  [ ] YES  [x] NO      MEDICATIONS  (STANDING):  atorvastatin 20 milliGRAM(s) Oral at bedtime  citalopram 10 milliGRAM(s) Oral daily  gabapentin 400 milliGRAM(s) Oral at bedtime    MEDICATIONS  (PRN):  acetaminophen   Tablet .. 650 milliGRAM(s) Oral every 6 hours PRN Temp greater or equal to 38.5C (101.3F), Mild Pain (1 - 3)      Care Discussed with Consultants/Other Providers [x] YES  [ ] NO    HEALTH ISSUES - PROBLEM Dx:  Angina pectoris    Hyperlipidemia    Anxiety    Spinal stenosis        
Reports mild ABURTO during ambulation to bathroom  SAturating 97-98% on 2LNCO2    Vital Signs Last 24 Hrs  T(C): 36.6 (01 Sep 2021 04:57), Max: 36.9 (31 Aug 2021 20:02)  T(F): 97.8 (01 Sep 2021 04:57), Max: 98.4 (31 Aug 2021 20:02)  HR: 66 (01 Sep 2021 04:57) (66 - 82)  BP: 144/59 (01 Sep 2021 04:57) (133/55 - 144/59)  BP(mean): 81 (01 Sep 2021 04:57) (81 - 81)  RR: 17 (01 Sep 2021 04:57) (17 - 18)  SpO2: 98% (01 Sep 2021 04:57) (97% - 98%)    I&O's Summary    08-31-21 @ 07:01  -  09-01-21 @ 07:00  --------------------------------------------------------  IN: 480 mL / OUT: 0 mL / NET: 480 mL        GENERAL: NAD, well-developed  HEENT: NCAT, EOMI  NECK: Supple, No JVD  CHEST/LUNG: Clear to auscultation bilaterally; Nl work of breathing  HEART: Regular rate and rhythm; Nl S1/S2; No murmurs, rubs, or gallops  ABDOMEN: Soft, Nontender, Nondistended; Bowel sounds present; No hepatosplenomegaly  EXTREMITIES:  2+ Peripheral Pulses, No clubbing, cyanosis, or edema  SKIN: No rashes or lesions; Normal turgor, texture  NEURO: A+O x 3; nonfocal CN/motor/sensory/reflexes  PSYCH: Nl affect; no agitation or delirium; no suicidal or homicidal ideation    LABS:                        13.3   7.31  )-----------( 268      ( 31 Aug 2021 00:54 )             41.6     08-31    140  |  104  |  11  ----------------------------<  101<H>  3.8   |  24  |  0.73    Ca    9.6      31 Aug 2021 00:54        CAPILLARY BLOOD GLUCOSE                RADIOLOGY & ADDITIONAL TESTS:    Imaging Personally Reviewed:  [x] YES  [ ] NO    Will obtain old records:  [ ] YES  [x] NO

## 2021-09-01 NOTE — DISCHARGE NOTE PROVIDER - CARE PROVIDER_API CALL
Ponce Wilhelm)  Cardiovascular Disease  [address], [address]  [place] [address]  Phone: [phone]  Fax: [phone]  Follow Up Time:     Joselito Perales)  Internal Medicine  [address], [address], [address]  [place] [address]  Phone: [phone]  Fax: [phone]  Follow Up Time:

## 2021-09-01 NOTE — DISCHARGE NOTE PROVIDER - NSDCMRMEDTOKEN_GEN_ALL_CORE_FT
citalopram 10 mg oral tablet: 1 tab(s) orally once a day  Crestor 5 mg oral tablet: 1 tab(s) orally once a day  gabapentin 100 mg oral capsule: 1-6 capsules by mouth at bedtime

## 2021-09-01 NOTE — DISCHARGE NOTE PROVIDER - HOSPITAL COURSE
HPI: 71-year-old female with medical history of Rheumatic Heart Disease, Hyperlipidemia, Vertigo, Spinal Stenosis, Left Rotator Cuff tear with nerve impingement (4 months ago), and Depression/Anxiety who presented to the hospital for chest pain. She stated her pain started around 5:30 pm on Sunday while she was making dinner. She had just eaten a snack and initially attributed the pain to indigestion. She described it as substernal near the xiphoid process, sharp, 8/10 in severity, and radiating to her back. It was not associated with shortness of breath. She stated her chest pain is currently resolved< from: Transthoracic Echocardiogram (08.30.21 @ 13:09) >    Conclusions:  1. Endocardium not well visualized; grossly normal left  ventricular systolic function.  2. The right ventricle is not well visualized; grossly  normal right ventricular systolic function.  *** No previous Echo exam.  ------------------------------------------------------------------------  Confirmed on  8/30/2021 - 16:28:34 by VINCENT Babb    < end of copied text >  < from: Cardiac Catherization (09.01.21 @ 13:23) >    Procedures Performed   Procedures:               1.    Art Access - R radial artery     2.    Left Coronary Angio   3.    Right Coronary Angio     Diagnostic Conclusions:   The coronary anatomy is normal   Normal coronaries.  Medical management for noncardiac chest pain     Procedure Narrative:   The risks and alternatives of the procedures and conscious sedation  were explained to the patient and informed consent was  obtained. The patient was brought to the cath lab and placed on the  table.    Access   Right radial artery:   The puncture site was infiltrated with 1% Lidocaine. Vascular access  was obtained using modified seldinger technique.    Coronary Angiography   Left Coronary System:   A catheter was positioned into the vessel ostia under fluoroscopic  guidance. Angiograms were obtained in multiple views.  Right Coronary System:   A catheter was positioned into the vessel ostia under fluoroscopic  guidance. Angiograms were obtained in multiple views.    Diagnostic Findings:     Coronary Angiography   The coronary circulation is right dominant.      LM   Left main artery: Angiography shows no disease.      LAD   Left anterior descending artery: Angiography shows no disease.      CX     Patient: MASOUD MEAD              MRN: [id number]  Study Date: 09/01/2021   01:23 PM      Page 1 of 3          Circumflex: Angiography shows no disease.      RCA   Right coronary artery: Angiography shows no disease.

## 2021-09-01 NOTE — DISCHARGE NOTE NURSING/CASE MANAGEMENT/SOCIAL WORK - PATIENT PORTAL LINK FT
You can access the FollowMyHealth Patient Portal offered by [facility] by registering at the following website: [email or web address]. By joining [employer]’s FollowMyHealth portal, you will also be able to view your health information using other applications (apps) compatible with our system.

## 2021-09-07 ENCOUNTER — TRANSCRIPTION ENCOUNTER (OUTPATIENT)
Age: 71
End: 2021-09-07

## 2021-11-19 PROBLEM — R01.1 CARDIAC MURMUR, UNSPECIFIED: Chronic | Status: ACTIVE | Noted: 2021-08-29

## 2021-11-19 PROBLEM — E78.5 HYPERLIPIDEMIA, UNSPECIFIED: Chronic | Status: ACTIVE | Noted: 2021-08-29

## 2021-12-03 ENCOUNTER — OUTPATIENT (OUTPATIENT)
Dept: OUTPATIENT SERVICES | Facility: HOSPITAL | Age: 71
LOS: 1 days | End: 2021-12-03
Payer: MEDICARE

## 2021-12-03 ENCOUNTER — RESULT REVIEW (OUTPATIENT)
Age: 71
End: 2021-12-03

## 2021-12-03 ENCOUNTER — APPOINTMENT (OUTPATIENT)
Dept: ULTRASOUND IMAGING | Facility: IMAGING CENTER | Age: 71
End: 2021-12-03
Payer: MEDICARE

## 2021-12-03 DIAGNOSIS — Z00.8 ENCOUNTER FOR OTHER GENERAL EXAMINATION: ICD-10-CM

## 2021-12-03 PROCEDURE — 88173 CYTOPATH EVAL FNA REPORT: CPT | Mod: 26

## 2021-12-03 PROCEDURE — 88173 CYTOPATH EVAL FNA REPORT: CPT

## 2021-12-03 PROCEDURE — 10006 FNA BX W/US GDN EA ADDL: CPT

## 2021-12-03 PROCEDURE — 10005 FNA BX W/US GDN 1ST LES: CPT

## 2021-12-03 PROCEDURE — 88172 CYTP DX EVAL FNA 1ST EA SITE: CPT

## 2023-08-10 NOTE — ED ADULT TRIAGE NOTE - HEART RATE (BEATS/MIN)
78 Abdomen soft, non-tender and non-distended, no rebound, no guarding and no masses. no hepatosplenomegaly.

## 2025-05-01 PROBLEM — M25.552 LEFT HIP PAIN: Status: ACTIVE | Noted: 2025-05-01

## 2025-05-02 ENCOUNTER — NON-APPOINTMENT (OUTPATIENT)
Age: 75
End: 2025-05-02

## 2025-05-02 ENCOUNTER — APPOINTMENT (OUTPATIENT)
Dept: ORTHOPEDIC SURGERY | Facility: CLINIC | Age: 75
End: 2025-05-02

## 2025-05-02 VITALS
HEIGHT: 65 IN | HEART RATE: 84 BPM | WEIGHT: 150 LBS | BODY MASS INDEX: 24.99 KG/M2 | SYSTOLIC BLOOD PRESSURE: 132 MMHG | DIASTOLIC BLOOD PRESSURE: 85 MMHG

## 2025-05-02 DIAGNOSIS — M25.552 PAIN IN LEFT HIP: ICD-10-CM

## 2025-05-02 PROCEDURE — 20610 DRAIN/INJ JOINT/BURSA W/O US: CPT | Mod: LT

## 2025-05-02 PROCEDURE — 73502 X-RAY EXAM HIP UNI 2-3 VIEWS: CPT

## 2025-05-02 PROCEDURE — 99204 OFFICE O/P NEW MOD 45 MIN: CPT | Mod: 25

## 2025-05-02 RX ORDER — MELOXICAM 15 MG/1
15 TABLET ORAL DAILY
Qty: 30 | Refills: 1 | Status: ACTIVE | COMMUNITY
Start: 2025-05-02 | End: 1900-01-01

## 2025-05-09 ENCOUNTER — APPOINTMENT (OUTPATIENT)
Dept: ORTHOPEDIC SURGERY | Facility: CLINIC | Age: 75
End: 2025-05-09
Payer: MEDICARE

## 2025-05-09 VITALS — BODY MASS INDEX: 25.33 KG/M2 | WEIGHT: 152 LBS | HEIGHT: 65 IN

## 2025-05-09 DIAGNOSIS — M54.16 RADICULOPATHY, LUMBAR REGION: ICD-10-CM

## 2025-05-09 PROCEDURE — 99204 OFFICE O/P NEW MOD 45 MIN: CPT

## 2025-05-09 RX ORDER — TIZANIDINE 2 MG/1
2 TABLET ORAL EVERY 6 HOURS
Qty: 56 | Refills: 1 | Status: ACTIVE | COMMUNITY
Start: 2025-05-09 | End: 1900-01-01

## 2025-05-09 RX ORDER — DICLOFENAC SODIUM 75 MG/1
75 TABLET, DELAYED RELEASE ORAL
Qty: 40 | Refills: 1 | Status: ACTIVE | COMMUNITY
Start: 2025-05-09 | End: 1900-01-01

## 2025-05-10 ENCOUNTER — OUTPATIENT (OUTPATIENT)
Dept: OUTPATIENT SERVICES | Facility: HOSPITAL | Age: 75
LOS: 1 days | End: 2025-05-10
Payer: MEDICARE

## 2025-05-10 ENCOUNTER — APPOINTMENT (OUTPATIENT)
Dept: MRI IMAGING | Facility: CLINIC | Age: 75
End: 2025-05-10

## 2025-05-10 DIAGNOSIS — M54.16 RADICULOPATHY, LUMBAR REGION: ICD-10-CM

## 2025-05-10 PROCEDURE — 72148 MRI LUMBAR SPINE W/O DYE: CPT

## 2025-05-10 PROCEDURE — 72148 MRI LUMBAR SPINE W/O DYE: CPT | Mod: 26

## 2025-05-10 PROCEDURE — A9585: CPT

## 2025-06-06 ENCOUNTER — APPOINTMENT (OUTPATIENT)
Dept: ORTHOPEDIC SURGERY | Facility: CLINIC | Age: 75
End: 2025-06-06
Payer: MEDICARE

## 2025-06-06 PROCEDURE — 99215 OFFICE O/P EST HI 40 MIN: CPT

## 2025-08-08 ENCOUNTER — APPOINTMENT (OUTPATIENT)
Dept: ORTHOPEDIC SURGERY | Facility: CLINIC | Age: 75
End: 2025-08-08